# Patient Record
Sex: MALE | NOT HISPANIC OR LATINO | URBAN - METROPOLITAN AREA
[De-identification: names, ages, dates, MRNs, and addresses within clinical notes are randomized per-mention and may not be internally consistent; named-entity substitution may affect disease eponyms.]

---

## 2019-08-08 NOTE — PATIENT DISCUSSION
We reviewed that smoking is the major reversible risk factor for AMD and encouraged smoking cessation.

## 2019-09-11 PROBLEM — I10 ESSENTIAL HYPERTENSION: Status: ACTIVE | Noted: 2019-09-11

## 2019-10-17 NOTE — PATIENT DISCUSSION
Trace cystic change, vision improved.  Continue to use Pred, taper tid, bid, qday @ 2 week intervals, Return in 2 months.

## 2020-01-09 NOTE — PATIENT DISCUSSION
Cataract surgery recommended. MF IOL OK, Cleared for Surgery. Follow up with Dr. Navin Nicole for consult.

## 2020-02-03 NOTE — PATIENT DISCUSSION
Per DWS, confirm patient is satisfied with her VA OD with Symfony, seble OD before proceeding with IOL OS due to prior CK OU and LASIK OU.

## 2020-02-03 NOTE — PATIENT DISCUSSION
The patient expressed a desire to see through the full range of vision from distance, to middle, to near without glasses. The limitations of advanced lens technology were reviewed and the recommendation was made for an extended depth of focus lens (Symfony) in combination with a multifocal lens. Patient understands that each lens will provide only 2 of the 3 ranges of vision. Side effects, specifically halos, reduced contrast, and a 1 in 500 exchange rate due to failure to adapt to the lens were discussed as was the need for enhancement in some cases. The patient elects to proceed with Symfony IOL OD, goal of emmetropia.

## 2020-02-15 NOTE — PATIENT DISCUSSION
PATIENT STOOD UP AND ATTEMPTING TO GO TO THE BATHROOM. The types of intraocular lenses were reviewed with the patient along with a discussion of their various strengths and weaknesses.

## 2020-02-18 NOTE — PATIENT DISCUSSION
Cataract surgery has been performed in the first eye and activities of daily living are still impaired. The patient would like to proceed with cataract surgery in the second eye as scheduled. The patient elects TMF, goal of Neha.

## 2020-03-10 NOTE — PATIENT DISCUSSION
Symjanet, seble OD / TMF +325 OS  (OD first, pt has always had OD for distance with her monovision even though she is OS dominant)  Epi-LASEK for enhancement OU.

## 2020-03-19 ENCOUNTER — IMPORTED ENCOUNTER (OUTPATIENT)
Dept: URBAN - METROPOLITAN AREA CLINIC 1 | Facility: CLINIC | Age: 45
End: 2020-03-19

## 2020-03-19 PROBLEM — H01.004: Noted: 2020-03-19

## 2020-03-19 PROBLEM — H43.393: Noted: 2020-03-19

## 2020-03-19 PROBLEM — H01.001: Noted: 2020-03-19

## 2020-03-19 PROCEDURE — 92015 DETERMINE REFRACTIVE STATE: CPT

## 2020-03-19 PROCEDURE — 92004 COMPRE OPH EXAM NEW PT 1/>: CPT

## 2020-03-19 NOTE — PATIENT DISCUSSION
1.  Anterior Blepharitis OU -- Recommend daily hot compresses and baby shampoo lid scrubs. 2. Floaters OU -- No PVD tears holes or detachments noted on dilated exam. Patient reassured. RD precautions. Mrx finalized. Letter to PCP. Return for an appointment in 1 year 27 with Dr. Tipton.

## 2020-04-15 NOTE — PATIENT DISCUSSION
Advised regular use of Amsler grid.
CHARLEY's in family history. Monitor.
CL stable.
Call immediately to report any decreased vision or visual distortion.
Cleared to proceed with AV IOL cataract surgery per  C[facility] Number 1/9/20.
Discussed the risks/benefits of laser capsulotomy. Laser recommended. Patient elects to proceed.
Discussed with the patient the predictability of visual outcome after cataract surgery is more difficult in previous refractive surgery patients. The patient is at greater risk for the need of enhancement to achieve desired visual outcome.
Glasses Prescription given to patient.
Good post operative appearance.
Imprimis.
LensAR.
Monitor for possible LVC.
Monitor.
Patient educated to discontinue contact lens wear 1 week(s) prior to surgery.
Patient given Rx for glasses.
Patient made aware of 24/7 emergency services.
Patient understands condition, prognosis and need for follow up care.
Patient understands there is an increased risk of corneal edema after cataract surgery.
Recommend AREDS 2 multivitamin supplements.
Recommended artificial tears to use: 1 drop 4x a day in both eyes.
Recommended observation.
Repeat SO's/Pentacam OU prior to surgery per DWS.
Retinal tear and detachment warning symptoms reviewed and patient instructed to call immediately if increasing floaters, flashes, or decreasing peripheral vision.
Stable.
Symjanet, seble OD / TMF +325 OS  (OD first, pt has always had OD for distance with her monovision even though she is OS dominant)  Epi-LASEK for enhancement OU.
okay for Epi-LASEK if enhancement needed after IOL sx.
Alert and oriented to person, place and time, memory intact, behavior appropriate to situation, PERRL.

## 2020-07-15 ENCOUNTER — HOSPITAL ENCOUNTER (OUTPATIENT)
Dept: LAB | Age: 45
Discharge: HOME | End: 2020-07-15

## 2020-07-15 LAB — SENTARA SPECIMEN COL,SENBCF: NORMAL

## 2020-07-15 PROCEDURE — 99001 SPECIMEN HANDLING PT-LAB: CPT

## 2021-01-21 NOTE — PATIENT DISCUSSION
CL trials dispensed. RTC to finalize prior to [facility] consideration.

## 2021-02-03 NOTE — PATIENT DISCUSSION
CL trials dispensed, +.50 vs +.75 OU. RTC to finalize prior [facility]

## 2021-02-16 NOTE — PATIENT DISCUSSION
CL trials dispensed, +.50 vs +.75 OU. RTC to finalize prior to [facility] consideration. Results. Goal +.50 OD and +.75 OS.

## 2021-03-05 NOTE — PATIENT DISCUSSION
After a contact lens trial with Dr. Carlo Schrader and many trial frames in the office, the patient most appreciates +0.75 over OS (goal of -0.25 with Lasik). Discussed the risks and benefits of proceeding with further Lasik enhancement. Patient is overall functioning well and only reaches for reading glasses for fine print or fine details. Recommend a consult with Dr. Tae Unger to discuss further enhancement and if he thinks it would be beneficial. Patient has had prior CK and Lasik so would need Epi-Lasek. Need to consider if CK scars would interfere with Epi-Lasek treatment since it is hyperopic treatment. The patient can decide if she'd like to proceed after consulting with Dr. Tae Unger.

## 2021-03-05 NOTE — PATIENT DISCUSSION
Enhancement not recommended at this time since the trial frame only made minimal improvement to the distance/reading vision. Most improvement comes from the left eye.

## 2021-04-22 NOTE — PATIENT DISCUSSION
After a contact lens trial with Dr. Oskar Branch, patient appreciates a goal of -0.25 with epi-lasek OS.  Discussed different focal points with daily activities, patient to adjust where she holds things like sewing needles to find her sweet spot to help. Will not proceed with lasik.

## 2021-09-04 NOTE — PATIENT DISCUSSION
RN spoke with Dr. Stratton on unit.  Orders for regular diet in computer, RN verified that patient can have light meal at this time.  OK per MD for patient to have light meal, menu provided.     Retinal tear and detachment warning symptoms reviewed and patient instructed to call immediately if increasing floaters, flashes, or decreasing peripheral vision.

## 2022-04-02 ASSESSMENT — TONOMETRY
OS_IOP_MMHG: 17
OD_IOP_MMHG: 15

## 2022-04-02 ASSESSMENT — VISUAL ACUITY
OS_SC: J2
OD_CC: 20/25
OS_CC: 20/25
OD_SC: J2

## 2023-03-02 NOTE — PATIENT DISCUSSION
CHARLEY's in family history. Monitor. Form placed in STAT folder per message received from provider's office below.    Julia Camacho, RN  P Med Info Disability Pool  Hello,   We saw patient in office today, she is asking about her forms and if they can be completed ASAP as her job is asking about them. Just FYI - I know you have high form volumes right now.   Thanks,   Julia

## 2023-07-12 NOTE — PATIENT DISCUSSION
Cleared to proceed with AV IOL cataract surgery per Dr. Bree Garvey 1/9/20. Cyclosporine Counseling:  I discussed with the patient the risks of cyclosporine including but not limited to hypertension, gingival hyperplasia,myelosuppression, immunosuppression, liver damage, kidney damage, neurotoxicity, lymphoma, and serious infections. The patient understands that monitoring is required including baseline blood pressure, CBC, CMP, lipid panel and uric acid, and then 1-2 times monthly CMP and blood pressure.

## 2023-08-05 NOTE — PATIENT DISCUSSION
Discussed with the patient the predictability of visual outcome after cataract surgery is more difficult in previous refractive surgery patients. The patient is at greater risk for the need of enhancement to achieve desired visual outcome. 05-Aug-2023 14:30

## 2025-05-05 NOTE — PROCEDURE NOTE: SURGICAL
Subjective: Patient course is stable.  No significant issues overnight.  Patient continues to have total body aches and pains    Medications:   Medications (8) Active  Scheduled: (6)  aspirin 81mg EC Tab  81 mg 1 tab, PO, qAM  cholecalciferol (Vitamin D) 1,000 unit Tab  , PO, qPM  lisinopril 5 mg Tab  5 mg 1 tab, PO, qHS  multiple vitamin with minerals Tab  1 tab, PO, qHS  regadenoson 0.4 mg/5 mL Syringe  0.4 mg 5 mL, IV Push, One Time  warfarin PHARMACY protocol  Order Dose, Misc, qDay  Continuous: (0)  PRN: (2)  loratadine 10 mg Tab  10 mg 1 tab, PO, qDay  morphine 2 mg/mL PF Inj 1 mL  2 mg 1 mL, IV Push, q15min        Objective:     Vitals:   Vital Signs (last 24 hrs)_____ Last Charted___________Minimum____________ Maximum____________  Temp    L 97.5 (OCT 30 07:53) L 97.5 (OCT 29 16:10) 98.6  (OCT 29 09:21)  Heart Rate   L 58 (OCT 30 07:53) L 53 (OCT 30 04:16) 74  (OCT 29 09:21)  Resp Rate       16  (OCT 30 07:53) L 13 (OCT 29 10:30) 19  (OCT 29 12:14)  SBP    129  (OCT 30 07:53) 111  (OCT 29 11:00) H 142 (OCT 29 20:58)  DBP    73  (OCT 30 07:53) 63  (OCT 29 10:30) 73  (OCT 29 12:14)      Neck: normal jvd. no delay in carotid upstroke, no bruit  Respiratory: clear vesicle breath sounds without added sounds  Back: no sacral edema, no costovertebral angle tenderness  CV: s1 s2 regular regular  Abdomen: soft, positive bowel sound, no hepatojugular reflux  Musculoskeletal: access site intact, no tenderness, no fluctuanc  Neuro: grossly intact    Labs:   Labs (Last four charted values)  WBC                  6.4 (OCT 30) 8.5 (OCT 29)   Hgb                  L 10.6 (OCT 30) 11.3 (OCT 29)   Hct                  35 (OCT 30) 37 (OCT 29)   Plt                  206 (OCT 30) 218 (OCT 29)   Na                   137 (OCT 30) 140 (OCT 29)   K                    4.7 (OCT 30) 4.4 (OCT 29)   CO2                  28 (OCT 30) 24 (OCT 29)   Cl                   103 (OCT 30) 106 (OCT 29)   Cr                   H 1.58 (OCT  <p>Prior to commencing surgery patient identification, surgical procedure, site, and side were confirmed by Dr. Champion.&nbsp; Following topical proparacaine anesthesia, the patient was positioned at the YAG laser, a contact lens coupled to the cornea of the right eye with methylcellulose and an axial posterior capsulotomy performed without complication using 3.7 Mj x 20. Attention was then turned to the left eye and a contact lens coupled to the cornea of the left eye with methylcellulose and an axial posterior capsulotomy performed without complication using 3.7 Mj x 30. One drop of Alphagan was instilled in both eyes and the patient returned to the holding area having tolerated the procedure well and without complication. </p><p>MRN#[id number]</p> Make sure to see dermatology on a regular basis.      30) H 1.52 (OCT 29)   BUN                  18 (OCT 30) 13 (OCT 29)   Glucose              90 (OCT 30) H 128 (OCT 29)   Ca                   9.1 (OCT 30) 9.3 (OCT 29)   PT                   H 33.5 (OCT 30) H 26.8 (OCT 29)   INR                  H 3.6 (OCT 30) H 2.9 (OCT 29)   PTT                  H 33 (OCT 29)   Troponin             <0.01 (OCT 29) <0.01 (OCT 29) <0.01 (OCT 29)     Assessment / Plan:   Atypical chest pain, pending stress test  Total body ache, difficult to assess  History of coronary disease status post CABG recently  Dyslipidemia  Stress test pending today thank you  _  _  _  _           Electronically Signed On 10.30.2018 08:22  ___________________________________________________   Darryn Dillon MD